# Patient Record
Sex: FEMALE | Race: WHITE | URBAN - METROPOLITAN AREA
[De-identification: names, ages, dates, MRNs, and addresses within clinical notes are randomized per-mention and may not be internally consistent; named-entity substitution may affect disease eponyms.]

---

## 2020-12-18 ENCOUNTER — OFFICE VISIT (OUTPATIENT)
Dept: URBAN - METROPOLITAN AREA CLINIC 75 | Facility: CLINIC | Age: 45
End: 2020-12-18
Payer: COMMERCIAL

## 2020-12-18 PROCEDURE — 92004 COMPRE OPH EXAM NEW PT 1/>: CPT | Performed by: OPTOMETRIST

## 2020-12-18 ASSESSMENT — VISUAL ACUITY
OS: 20/20
OD: 20/20

## 2020-12-18 ASSESSMENT — INTRAOCULAR PRESSURE
OD: 17
OS: 13

## 2020-12-18 NOTE — IMPRESSION/PLAN
Impression: Myopia, bilateral: H52.13. Plan: Discussed signs and symptoms of PVD/floaters. Discussed signs and symptoms of retinal detachment.

## 2020-12-21 ENCOUNTER — OFFICE VISIT (OUTPATIENT)
Dept: URBAN - METROPOLITAN AREA CLINIC 75 | Facility: CLINIC | Age: 45
End: 2020-12-21
Payer: COMMERCIAL

## 2020-12-21 DIAGNOSIS — H52.13 MYOPIA, BILATERAL: Primary | ICD-10-CM

## 2020-12-21 DIAGNOSIS — H43.812 VITREOUS DEGENERATION, LEFT EYE: ICD-10-CM

## 2020-12-21 PROCEDURE — 99213 OFFICE O/P EST LOW 20 MIN: CPT | Performed by: OPTOMETRIST

## 2020-12-21 ASSESSMENT — INTRAOCULAR PRESSURE
OD: 15
OS: 14

## 2020-12-21 NOTE — IMPRESSION/PLAN
Impression: Vitreous degeneration, left eye: H43.812 - OPTOS reveals partial PVD OS. Plan: Observe. Pt to contact office if symptoms worsen, including an increase in number of floaters, you experience flashing lights, loss of vision, or a black curtain blocking your field of vision.

## 2020-12-21 NOTE — IMPRESSION/PLAN
Impression: Myopia, bilateral: H52.13 - pt is high myope OU. Exam/OPTOS reveals no evidence of myopic degen, tears, or RD's. Discussed signs and symptoms of RD and increased risk due to hx of high myopia. Plan: Observe. 

Keep recall for full exam in 1 year

## 2021-12-20 ENCOUNTER — OFFICE VISIT (OUTPATIENT)
Dept: URBAN - METROPOLITAN AREA CLINIC 75 | Facility: CLINIC | Age: 46
End: 2021-12-20
Payer: COMMERCIAL

## 2021-12-20 DIAGNOSIS — H47.10 PAPILLEDEMA: ICD-10-CM

## 2021-12-20 DIAGNOSIS — H40.013 OPEN ANGLE WITH BORDERLINE FINDINGS, LOW RISK, BILATERAL: ICD-10-CM

## 2021-12-20 DIAGNOSIS — H52.4 PRESBYOPIA: Primary | ICD-10-CM

## 2021-12-20 PROCEDURE — 92014 COMPRE OPH EXAM EST PT 1/>: CPT | Performed by: OPTOMETRIST

## 2021-12-20 ASSESSMENT — INTRAOCULAR PRESSURE
OD: 25
OS: 19

## 2021-12-20 NOTE — IMPRESSION/PLAN
Impression: Open angle with borderline findings, low risk, bilateral: H40.013. IOP & Disk elevation causing suspicion. Glaucoma suspects have some characteristics of glaucoma, but not all, and the findings may be very subtle. Glaucoma suspects may also include individuals with other risk factors such as a family history of glaucoma, temporary elevations of IOP when taking steroid medications, pseudoexfoliation syndrome,
pigmentary dispersion syndrome, thin central corneal thickness, and optic disc hemorrhages. Plan: Discussed glaucoma suspects do not usually need to be treated but do need close follow up monitoring for early signs of glaucoma damage. If treatment is necessary then glaucoma eye drops and pills are utilized. Laser procedures and interocular surgery are usually reserved for patients with uncontrolled glaucoma. Return for OCT to Eval further.  

Contact the office if: Any loss of vision, eye pain, cloudy vision, photophobia, or dark spots or clouds blocking portions of your vision occurs

## 2021-12-20 NOTE — IMPRESSION/PLAN
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 HX of neck pain/adjustment creating migraines. Papilledema is a condition in which increased pressure in or around the brain causes the part of the optic nerve inside the eye to swell. Symptoms may be fleeting disturbances in vision, headache, vomiting, or a combination. Plan: Discussed. 

Patient recommended to return for ordered OCT to further eval.

## 2021-12-20 NOTE — IMPRESSION/PLAN
Impression: Presbyopia: H52.4. Discussed presbyopia occurs as we get older and is the inability to focus on objects (ie: accommodate) due to the loss of flexibility of your natural lens. Reading glasses, prism glasses, bifocals, trifocals or contact lenses can be helpful. Plan: New glasses Rx were distributed today. Patient to call office with any questions or concerns.

## 2022-03-28 ENCOUNTER — OFFICE VISIT (OUTPATIENT)
Dept: URBAN - METROPOLITAN AREA CLINIC 75 | Facility: CLINIC | Age: 47
End: 2022-03-28
Payer: COMMERCIAL

## 2022-03-28 DIAGNOSIS — H25.13 AGE-RELATED NUCLEAR CATARACT, BILATERAL: ICD-10-CM

## 2022-03-28 DIAGNOSIS — G47.39 OTHER SLEEP APNEA: ICD-10-CM

## 2022-03-28 PROCEDURE — 99214 OFFICE O/P EST MOD 30 MIN: CPT | Performed by: OPTOMETRIST

## 2022-03-28 PROCEDURE — 92133 CPTRZD OPH DX IMG PST SGM ON: CPT | Performed by: OPTOMETRIST

## 2022-03-28 ASSESSMENT — INTRAOCULAR PRESSURE
OD: 16
OS: 22

## 2022-03-28 NOTE — IMPRESSION/PLAN
Impression: Age-related nuclear cataract, bilateral: H25.13. Plan: Cataracts are trace and not interfering with her vision. No treatment currently recommended. The patient will monitor vision changes and contact us with any decrease in vision.

## 2022-03-28 NOTE — IMPRESSION/PLAN
Impression: Open angle with borderline findings, low risk, bilateral: H40.013. Plan: No suspicion for glaucoma at this time. RNFL thick and healthy OU. IOP stable and high readings are not consistent to warrant treatment. findings more indicative of possible sleep apnea.  Will continue to monitor

## 2022-03-28 NOTE — IMPRESSION/PLAN
Impression: Other sleep apnea: G47.39. Plan: OCT done today indicative of possible sleep apnea. PT reports a strong family HX of sleep apnea and she is already using a mouth device for sleep apnea but has not had a sleep study done yet. Recommend PT have a sleep study done for further evaluation when convenient for her.

## 2023-06-08 ENCOUNTER — OFFICE VISIT (OUTPATIENT)
Dept: URBAN - METROPOLITAN AREA CLINIC 75 | Facility: CLINIC | Age: 48
End: 2023-06-08
Payer: COMMERCIAL

## 2023-06-08 DIAGNOSIS — H40.013 OPEN ANGLE WITH BORDERLINE FINDINGS, LOW RISK, BILATERAL: Primary | ICD-10-CM

## 2023-06-08 DIAGNOSIS — H44.23 DEGENERATIVE MYOPIA, BILATERAL: ICD-10-CM

## 2023-06-08 DIAGNOSIS — H25.13 AGE-RELATED NUCLEAR CATARACT, BILATERAL: ICD-10-CM

## 2023-06-08 PROCEDURE — 99213 OFFICE O/P EST LOW 20 MIN: CPT | Performed by: OPTOMETRIST

## 2023-06-08 PROCEDURE — 92133 CPTRZD OPH DX IMG PST SGM ON: CPT | Performed by: OPTOMETRIST

## 2023-06-08 ASSESSMENT — INTRAOCULAR PRESSURE
OD: 19
OS: 16

## 2023-06-08 NOTE — IMPRESSION/PLAN
Impression: Open angle with borderline findings, low risk, bilateral: H40.013. OCT ordered and performed thin stable RNFL due to poor signal strength in testing both years of testing Suspicion based on cupping Plan: Glaucoma suspects do not usually need to be treated but do need close follow up monitoring for early signs of glaucoma damage. Some glaucoma suspects require treatment, and the same medical options of glaucoma eye drops and oral medications are utilized. No treatment needed at this time. Patient recommended Vitamin D, Turmeric, Niacin (B complex) supplement. Contact office if any loss of vision, eye pain, cloudy vision, photophobia, or dark spots or clouds blocking portions of your vision occurs.

## 2023-06-08 NOTE — IMPRESSION/PLAN
Impression: Degenerative myopia, bilateral: H44.23. Plan: High myopia is an inherited type of high-degree nearsightedness is called high myopia. It happens when your eyeballs grow longer than they should or the cornea is too steep Educated pt on signs of retinal detachment. Contact office with any changes in vision or concerns.

## 2023-06-09 ENCOUNTER — OFFICE VISIT (OUTPATIENT)
Dept: URBAN - METROPOLITAN AREA CLINIC 75 | Facility: CLINIC | Age: 48
End: 2023-06-09
Payer: COMMERCIAL

## 2023-06-09 DIAGNOSIS — H52.4 PRESBYOPIA: Primary | ICD-10-CM

## 2023-06-09 PROCEDURE — 92014 COMPRE OPH EXAM EST PT 1/>: CPT | Performed by: OPTOMETRIST

## 2023-06-09 ASSESSMENT — VISUAL ACUITY
OD: 20/20
OS: 20/20

## 2023-06-09 ASSESSMENT — INTRAOCULAR PRESSURE
OD: 16
OS: 16

## 2023-06-09 NOTE — IMPRESSION/PLAN
Impression: Presbyopia: H52.4. Plan: ed pt on findings and released updated SRx. increased add power to help compensate.